# Patient Record
Sex: FEMALE | Race: BLACK OR AFRICAN AMERICAN | NOT HISPANIC OR LATINO | Employment: UNEMPLOYED | URBAN - METROPOLITAN AREA
[De-identification: names, ages, dates, MRNs, and addresses within clinical notes are randomized per-mention and may not be internally consistent; named-entity substitution may affect disease eponyms.]

---

## 2017-05-30 ENCOUNTER — RECORDS - HEALTHEAST (OUTPATIENT)
Dept: LAB | Facility: CLINIC | Age: 32
End: 2017-05-30

## 2017-05-30 LAB — HIV 1+2 AB+HIV1 P24 AG SERPL QL IA: NEGATIVE

## 2017-05-31 LAB
HBV SURFACE AG SERPL QL IA: NEGATIVE
SYPHILIS RPR SCREEN - HISTORICAL: NORMAL

## 2017-10-27 ENCOUNTER — RECORDS - HEALTHEAST (OUTPATIENT)
Dept: LAB | Facility: CLINIC | Age: 32
End: 2017-10-27

## 2017-10-30 LAB — SYPHILIS RPR SCREEN - HISTORICAL: NORMAL

## 2018-01-05 ENCOUNTER — ANESTHESIA - HEALTHEAST (OUTPATIENT)
Dept: OBGYN | Facility: HOSPITAL | Age: 33
End: 2018-01-05

## 2021-06-15 NOTE — ANESTHESIA PROCEDURE NOTES
Epidural Block    Patient location during procedure: OB  Time Called: 1/5/2018 11:28 AM  Reason for Block:labor epidural  Staffing:  Performing  Anesthesiologist: KATI COOPER  Preanesthetic Checklist  Completed: patient identified, risks, benefits, and alternatives discussed, timeout performed, consent obtained, airway assessed, oxygen available, suction available, emergency drugs available and hand hygiene performed  Procedure  Patient position: left lateral decubitus  Prep: Betadine and ChloraPrep  Patient monitoring: continuous pulse oximetry, heart rate and blood pressure  Approach: midline  Location: L1-L2  Injection technique: EMMA saline  Number of Attempts:1  Needle  Needle type: Fany   Needle gauge: 18 G     Catheter in Space: 4  Assessment  Sensory level: T8  No complications

## 2021-06-15 NOTE — ANESTHESIA PREPROCEDURE EVALUATION
Anesthesia Evaluation      Patient summary reviewed   No history of anesthetic complications     Airway   Mallampati: I  Neck ROM: full   Pulmonary - negative ROS and normal exam                          Cardiovascular - negative ROS and normal exam  (-) murmur  Rhythm: regular  Rate: normal,    no murmur      Neuro/Psych - negative ROS     Endo/Other    (+) pregnant     GI/Hepatic/Renal - negative ROS           Dental - normal exam                        Anesthesia Plan  Planned anesthetic: epidural    ASA 2     Anesthetic plan and risks discussed with: patient and spouse    Post-op plan: routine recovery

## 2021-06-15 NOTE — ANESTHESIA POSTPROCEDURE EVALUATION
Patient: Lupe Andersen  * No procedures listed *  Anesthesia type: epidural    Patient location: [facility]  Last vitals:   Vitals:    01/06/18 0839   BP: 113/65   Pulse: 65   Resp: 20   Temp: 36.7  C (98  F)   SpO2:      Post vital signs: stable  Level of consciousness: awake and responds to simple questions  Post-anesthesia pain: pain controlled  Post-anesthesia nausea and vomiting: no  Pulmonary: unassisted, return to baseline  Cardiovascular: stable and blood pressure at baseline  Hydration: adequate  Anesthetic events: no    QCDR Measures:  ASA# 11 - Domenica-op Cardiac Arrest: ASA11B - Patient did NOT experience unanticipated cardiac arrest  ASA# 12 - Domenica-op Mortality Rate: ASA12B - Patient did NOT die  ASA# 13 - PACU Re-Intubation Rate: NA - No ETT / LMA used for case  ASA# 10 - Composite Anes Safety: ASA10A - No serious adverse event    Additional Notes:

## 2021-06-16 PROBLEM — Z34.90 PREGNANT: Status: ACTIVE | Noted: 2018-01-05

## 2021-09-16 ENCOUNTER — LAB REQUISITION (OUTPATIENT)
Dept: LAB | Facility: CLINIC | Age: 36
End: 2021-09-16

## 2021-09-16 DIAGNOSIS — R10.9 UNSPECIFIED ABDOMINAL PAIN: ICD-10-CM

## 2021-09-16 PROCEDURE — 87086 URINE CULTURE/COLONY COUNT: CPT | Performed by: PHYSICIAN ASSISTANT

## 2021-09-16 PROCEDURE — 84702 CHORIONIC GONADOTROPIN TEST: CPT | Performed by: PHYSICIAN ASSISTANT

## 2021-09-17 LAB
BACTERIA UR CULT: NO GROWTH
HCG SERPL-ACNC: 6646 MLU/ML (ref 0–4)

## 2021-10-10 LAB
HEMOGLOBIN (EXTERNAL): 11.5 G/DL (ref 12–16)
HEMOGLOBIN (EXTERNAL): 11.5 G/DL (ref 12–16)
HEPATITIS B SURFACE ANTIGEN (EXTERNAL): NONREACTIVE
HEPATITIS C ANTIBODY (EXTERNAL): NONREACTIVE
HIV1+2 AB SERPL QL IA: NONREACTIVE
PLATELET COUNT (EXTERNAL): 210 10E3/UL (ref 150–400)
RUBELLA ANTIBODY IGG (EXTERNAL): NORMAL
TREPONEMA PALLIDUM ANTIBODY (EXTERNAL): NONREACTIVE

## 2021-10-18 ENCOUNTER — APPOINTMENT (OUTPATIENT)
Dept: ULTRASOUND IMAGING | Facility: HOSPITAL | Age: 36
End: 2021-10-18
Attending: FAMILY MEDICINE
Payer: COMMERCIAL

## 2021-10-18 ENCOUNTER — HOSPITAL ENCOUNTER (EMERGENCY)
Facility: HOSPITAL | Age: 36
Discharge: HOME | End: 2021-10-18
Attending: EMERGENCY MEDICINE | Admitting: EMERGENCY MEDICINE
Payer: COMMERCIAL

## 2021-10-18 VITALS
RESPIRATION RATE: 18 BRPM | WEIGHT: 134 LBS | HEART RATE: 74 BPM | DIASTOLIC BLOOD PRESSURE: 74 MMHG | BODY MASS INDEX: 23.74 KG/M2 | TEMPERATURE: 98.4 F | HEIGHT: 63 IN | SYSTOLIC BLOOD PRESSURE: 119 MMHG | OXYGEN SATURATION: 100 %

## 2021-10-18 DIAGNOSIS — R10.2 SUPRAPUBIC ABDOMINAL PAIN: ICD-10-CM

## 2021-10-18 DIAGNOSIS — O26.891 ABDOMINAL PAIN IN PREGNANCY, FIRST TRIMESTER: ICD-10-CM

## 2021-10-18 DIAGNOSIS — R10.9 ABDOMINAL PAIN IN PREGNANCY, FIRST TRIMESTER: ICD-10-CM

## 2021-10-18 PROBLEM — M54.30 SCIATICA: Status: ACTIVE | Noted: 2021-10-18

## 2021-10-18 LAB — HCG SERPL-ACNC: ABNORMAL MLU/ML (ref 0–4)

## 2021-10-18 PROCEDURE — 99285 EMERGENCY DEPT VISIT HI MDM: CPT | Mod: 25

## 2021-10-18 PROCEDURE — 36415 COLL VENOUS BLD VENIPUNCTURE: CPT | Performed by: FAMILY MEDICINE

## 2021-10-18 PROCEDURE — 76801 OB US < 14 WKS SINGLE FETUS: CPT

## 2021-10-18 PROCEDURE — 84702 CHORIONIC GONADOTROPIN TEST: CPT | Performed by: FAMILY MEDICINE

## 2021-10-18 ASSESSMENT — MIFFLIN-ST. JEOR: SCORE: 1271.95

## 2021-10-18 NOTE — DISCHARGE INSTRUCTIONS
Ultrasound today shows pregnancy in the uterus measuring 9 weeks 4 days with normal fetal heart rate.  We could not see the IUD on your ultrasound today.  Follow-up with your doctor as discussed.  Return to the ER for worsening pain.

## 2021-10-18 NOTE — ED TRIAGE NOTES
Pt arrives via [facility] from home with c/o lower abdominal pain. Pt is about 9 weeks pregnant. 3 previous pregnancies all went to full term without complications. Pt states that she had lower abdominal pain and spotting a few days ago, called her provider and was told to take ibuprofen for pain. Pt states that last dose was 2 days ago.   Pt denies vaginal bleeding today. Pt called EMS this AM due to sudden onset of 10/10 sharp lower abdominal cramping.

## 2021-10-18 NOTE — ED PROVIDER NOTES
EMERGENCY DEPARTMENT ENCOUNTER      NAME: Lupe Andersen  AGE: 36 year old female  YOB: 1985  MRN: [id number]  EVALUATION DATE & TIME: No admission date for patient encounter.    PCP: Beti Velásquez    ED PROVIDER: Celia Bray MD    Chief Complaint   Patient presents with     Abdominal Pain     Pregnancy Complications         FINAL IMPRESSION:  1. Suprapubic abdominal pain    2. Abdominal pain in pregnancy, first trimester          ED COURSE & MEDICAL DECISION MAKING:    Pertinent Labs & Imaging studies reviewed. (See chart for details)  36 year old female with history of G4, P3 at approximately 9 weeks based on LMP and previous ultrasound who presents to the Emergency Department for evaluation of abdominal pain in the suprapubic region.  She has had some abdominal pain and spotting throughout this pregnancy.  Differential includes pregnancy, threatened AB.  Her pain is suprapubic.  Reportedly had urinalysis done just 2 days ago as outpatient that was negative.  She does not have any urinary symptoms and therefore does not want to repeat this today.  She has had previous appendectomy.  No change in vaginal discharge.  No change in bowel movements.  A pelvic ultrasound was performed.  This shows single living intrauterine pregnancy at 9 weeks 4 days.  Fetal heart rate 167.  Of note patient has an IUD.  Her physician tried to remove this but was not able to do so.  Initially on her imaging they were not able to find the IUD but patient states that later on outpatient ultrasound they were then able to find it.  She is not able to tell me physically where this was located but was told that it was in an okay position.  On her ultrasound today we were not able to visualize the IUD.  She however does not have any peritoneal signs and my suspicion that the IUD has traveled, perforated is low and I do not think she warrants evaluation for same.  Knowing that they have had issues visualizing  her IUD previously we will would recommend she follow-up with her physician regarding same.  Warning signs return to the ED were discussed.  Encourage close follow-up.      ED Course as of Oct 18 150   Mon Oct 18, 2021   1237 I met with the patient, obtained history, performed an initial exam, and discussed options and plan for diagnostics and treatment here in the ED. I discussed the plan for discharge with the patient, and patient is agreeable.  We discussed supportive cares at home and reasons for return to the ER including new or worsening symptoms - all questions and concerns addressed.  Patient to be discharged by RN.            At the conclusion of the encounter I discussed the results of all of the tests and the disposition. The questions were answered. The patient or family acknowledged understanding and was agreeable with the care plan.      MEDICATIONS GIVEN IN THE EMERGENCY:  Medications - No data to display    NEW PRESCRIPTIONS STARTED AT TODAY'S ER VISIT  Discharge Medication List as of 10/18/2021  1:20 PM             =================================================================    HPI    Patient information was obtained from: patient    Use of Intrepreter: N/A        Lupe Black Batool Andersen is a 36 year old female with pertinent medical history of uterine atony who presents with abdominal pain.     Patient is  and ~9 weeks pregnant. Patient reports she has been feeling suprapubic cramping and spotting since she found out about the pregnancy. Patient presented today because she suprapubic pain became very severe and radiates into her back, although denies spotting. Patient reports she was unable to walk or stand from the pain.     Patient has an IUD currently in place which her OBGYN has attempted to remove twice. Patient reports having a vaginal delivery for her other three pregnancies. Patient has a surgical history of appendectomy but denies any medical history of problems with her  fallopian tubes, ovaries, or uterus. Patient denies dysuria, hematuria, diarrhea, constipation, bloody stool, or any other complaints at this time.       REVIEW OF SYSTEMS  Constitutional:  Denies fever, chills, weight loss or weakness  GI:  Denies nausea, vomiting, diarrhea. Positive for suprapubic abdominal pain.  : Denies dysuria, denies hematuria. Positive for vaginal spotting (resolved).   Musculoskeletal:  Denies any new muscle/joint pain, swelling or loss of function. Positive for back pain.  All other systems negative unless noted in HPI.      PAST MEDICAL HISTORY:  History reviewed. No pertinent past medical history.    PAST SURGICAL HISTORY:  Past Surgical History:   Procedure Laterality Date     APPENDECTOMY         CURRENT MEDICATIONS:    Prior to Admission Medications   Prescriptions Last Dose Informant Patient Reported? Taking?   acetaminophen (TYLENOL) 325 MG tablet   No No   Sig: [ACETAMINOPHEN (TYLENOL) 325 MG TABLET] Take 1-2 tablets (325-650 mg total) by mouth every 4 (four) hours as needed.   ferrous sulfate 65 mg elemental iron   Yes No   Sig: [FERROUS SULFATE 65 MG ELEMENTAL IRON] Take 1 tablet by mouth daily with breakfast.   ibuprofen (ADVIL,MOTRIN) 800 MG tablet   No No   Sig: [IBUPROFEN (ADVIL,MOTRIN) 800 MG TABLET] Take 1 tablet (800 mg total) by mouth every 8 (eight) hours.   lanolin (LANSINOH HPA) 100 % Oint   No No   Sig: [LANOLIN (LANSINOH HPA) 100 % OINT] Apply to nipples after feeds   prenatal vitamin iron-folic acid 27mg-0.8mg (PRENATAL S) 27 mg iron- 800 mcg Tab tablet   Yes No   Sig: [PRENATAL VITAMIN IRON-FOLIC ACID 27MG-0.8MG (PRENATAL S) 27 MG IRON- 800 MCG TAB TABLET] Take 1 tablet by mouth daily.      Facility-Administered Medications: None       ALLERGIES:  Allergies   Allergen Reactions     Flagyl [Metronidazole] Dizziness       FAMILY HISTORY:  History reviewed. No pertinent family history.    SOCIAL HISTORY:  Social History     Tobacco Use     Smoking status: Never  "Smoker     Smokeless tobacco: Never Used   Substance Use Topics     Alcohol use: No     Drug use: No        VITALS:  Patient Vitals for the past 24 hrs:   BP Temp Temp src Pulse Resp SpO2 Height Weight   10/18/21 1328 119/74 -- -- 74 18 -- -- --   10/18/21 1043 115/65 98.4  F (36.9  C) Temporal 85 18 100 % 1.6 m (5' 3\") 60.8 kg (134 lb)       PHYSICAL EXAM    General Appearance: Well-appearing, well-nourished, no acute distress   Head:  Normocephalic  Eyes:   conjunctiva/corneas clear  ENT:  membranes are moist without pallor  Neck:  Supple  Cardio:  Regular rate and rhythm  Pulm:  No respiratory distress  Back:  No CVA tenderness, normal ROM  Abdomen:  Soft, non distended,no rebound or guarding. Slight suprapubic abdominal pain.  Extremities: Moves all extremities normally, normal gait  Skin:  Skin warm, dry, no rashes  Neuro:  Alert and oriented ×3, moving all extremities, no gross sensory defects     RADIOLOGY/LABS:  Reviewed all pertinent imaging. Please see official radiology report. All pertinent labs reviewed and interpreted.    Results for orders placed or performed during the hospital encounter of 10/18/21   OB  US 1st trimester w transvag    Impression    IMPRESSION:   1.  Single living intrauterine gestation at 9 weeks 4 days, EDC 5/19/2022.  2.  No IUD visualized.       HCG quantitative pregnancy   Result Value Ref Range    hCG Quantitative 200,357 (H) 0 - 4 mlU/mL       The creation of this record is based on the scribe s observations of the work being performed by Celia Bray MD and the provider s statements to them. It was created on his behalf by Yesenia Hamlin, a trained medical scribe. This document has been checked and approved by the attending provider.    Celia Bray MD  Emergency Medicine  [facility] EMERGENCY DEPARTMENT  [facility] [facility] [address] [phone]  Dept: [phone]     Celia Bray MD  10/18/21 1507    "

## 2021-11-19 ENCOUNTER — LAB REQUISITION (OUTPATIENT)
Dept: LAB | Facility: CLINIC | Age: 36
End: 2021-11-19

## 2021-11-19 DIAGNOSIS — Z34.81 ENCOUNTER FOR SUPERVISION OF OTHER NORMAL PREGNANCY, FIRST TRIMESTER: ICD-10-CM

## 2021-11-19 PROCEDURE — G0123 SCREEN CERV/VAG THIN LAYER: HCPCS | Performed by: FAMILY MEDICINE

## 2021-11-19 PROCEDURE — 87624 HPV HI-RISK TYP POOLED RSLT: CPT | Performed by: FAMILY MEDICINE

## 2021-11-23 ENCOUNTER — LAB REQUISITION (OUTPATIENT)
Dept: LAB | Facility: CLINIC | Age: 36
End: 2021-11-23
Payer: COMMERCIAL

## 2021-11-23 DIAGNOSIS — Z11.52 ENCOUNTER FOR SCREENING FOR COVID-19: ICD-10-CM

## 2021-11-23 LAB
HUMAN PAPILLOMA VIRUS 16 DNA: NEGATIVE
HUMAN PAPILLOMA VIRUS 18 DNA: NEGATIVE
HUMAN PAPILLOMA VIRUS FINAL DIAGNOSIS: NORMAL
HUMAN PAPILLOMA VIRUS OTHER HR: NEGATIVE
SARS-COV-2 RNA RESP QL NAA+PROBE: NEGATIVE

## 2021-11-23 PROCEDURE — U0005 INFEC AGEN DETEC AMPLI PROBE: HCPCS | Mod: ORL | Performed by: FAMILY MEDICINE

## 2021-11-30 LAB
BKR LAB AP GYN ADEQUACY: NORMAL
BKR LAB AP GYN INTERPRETATION: NORMAL
BKR LAB AP GYN OTHER FINDINGS: NORMAL
BKR LAB AP HPV REFLEX: NORMAL
BKR LAB AP PREVIOUS ABNORMAL: NORMAL
PATH REPORT.COMMENTS IMP SPEC: NORMAL
PATH REPORT.COMMENTS IMP SPEC: NORMAL
PATH REPORT.RELEVANT HX SPEC: NORMAL

## 2022-02-07 ENCOUNTER — LAB REQUISITION (OUTPATIENT)
Dept: LAB | Facility: CLINIC | Age: 37
End: 2022-02-07

## 2022-02-07 DIAGNOSIS — R53.83 OTHER FATIGUE: ICD-10-CM

## 2022-02-07 PROCEDURE — 84466 ASSAY OF TRANSFERRIN: CPT | Performed by: PHYSICIAN ASSISTANT

## 2022-02-07 PROCEDURE — 82728 ASSAY OF FERRITIN: CPT | Performed by: PHYSICIAN ASSISTANT

## 2022-02-08 LAB
FERRITIN SERPL-MCNC: 24 NG/ML (ref 10–130)
IRON SATN MFR SERPL: 35 % (ref 20–50)
IRON SERPL-MCNC: 160 UG/DL (ref 42–175)
TIBC SERPL-MCNC: 453 UG/DL (ref 313–563)
TRANSFERRIN SERPL-MCNC: 362 MG/DL (ref 212–360)

## 2022-02-11 ENCOUNTER — LAB REQUISITION (OUTPATIENT)
Dept: LAB | Facility: CLINIC | Age: 37
End: 2022-02-11

## 2022-02-11 DIAGNOSIS — R10.2 PELVIC AND PERINEAL PAIN: ICD-10-CM

## 2022-02-11 PROCEDURE — 87086 URINE CULTURE/COLONY COUNT: CPT | Performed by: FAMILY MEDICINE

## 2022-02-13 LAB — BACTERIA UR CULT: NO GROWTH

## 2022-03-14 ENCOUNTER — LAB REQUISITION (OUTPATIENT)
Dept: LAB | Facility: CLINIC | Age: 37
End: 2022-03-14

## 2022-03-14 DIAGNOSIS — Z34.83 ENCOUNTER FOR SUPERVISION OF OTHER NORMAL PREGNANCY, THIRD TRIMESTER: ICD-10-CM

## 2022-03-14 PROCEDURE — 86780 TREPONEMA PALLIDUM: CPT | Performed by: FAMILY MEDICINE

## 2022-03-15 LAB — T PALLIDUM AB SER QL: NONREACTIVE

## 2022-05-02 ENCOUNTER — LAB REQUISITION (OUTPATIENT)
Dept: LAB | Facility: CLINIC | Age: 37
End: 2022-05-02

## 2022-05-02 DIAGNOSIS — Z34.83 ENCOUNTER FOR SUPERVISION OF OTHER NORMAL PREGNANCY, THIRD TRIMESTER: ICD-10-CM

## 2022-05-02 PROCEDURE — 87653 STREP B DNA AMP PROBE: CPT | Performed by: FAMILY MEDICINE

## 2022-05-03 LAB — GP B STREP DNA SPEC QL NAA+PROBE: NEGATIVE

## 2022-05-11 ENCOUNTER — ANESTHESIA (OUTPATIENT)
Dept: OBGYN | Facility: HOSPITAL | Age: 37
End: 2022-05-11
Payer: COMMERCIAL

## 2022-05-11 ENCOUNTER — HOSPITAL ENCOUNTER (INPATIENT)
Facility: HOSPITAL | Age: 37
LOS: 2 days | Discharge: HOME | End: 2022-05-13
Attending: FAMILY MEDICINE | Admitting: FAMILY MEDICINE
Payer: COMMERCIAL

## 2022-05-11 ENCOUNTER — ANESTHESIA EVENT (OUTPATIENT)
Dept: OBGYN | Facility: HOSPITAL | Age: 37
End: 2022-05-11
Payer: COMMERCIAL

## 2022-05-11 DIAGNOSIS — D62 ANEMIA DUE TO BLOOD LOSS, ACUTE: ICD-10-CM

## 2022-05-11 PROBLEM — Z36.89 ENCOUNTER FOR TRIAGE IN PREGNANT PATIENT: Status: ACTIVE | Noted: 2022-05-11

## 2022-05-11 PROBLEM — O47.9 UTERINE CONTRACTIONS: Status: ACTIVE | Noted: 2022-05-11

## 2022-05-11 LAB
ABO/RH(D): NORMAL
ANTIBODY SCREEN: NEGATIVE
APTT PPP: 24 SECONDS (ref 22–38)
BLD PROD TYP BPU: NORMAL
BLOOD COMPONENT TYPE: NORMAL
CODING SYSTEM: NORMAL
CROSSMATCH: NORMAL
D DIMER PPP FEU-MCNC: 12.89 UG/ML FEU (ref 0–0.5)
ERYTHROCYTE [DISTWIDTH] IN BLOOD BY AUTOMATED COUNT: 14.6 % (ref 10–15)
FIBRINOGEN PPP-MCNC: 446 MG/DL (ref 170–490)
HCT VFR BLD AUTO: 33.6 % (ref 35–47)
HGB BLD-MCNC: 10.5 G/DL (ref 11.7–15.7)
HOLD SPECIMEN: NORMAL
INR PPP: 1.03 (ref 0.85–1.15)
ISSUE DATE AND TIME: NORMAL
MCH RBC QN AUTO: 28.5 PG (ref 26.5–33)
MCHC RBC AUTO-ENTMCNC: 31.3 G/DL (ref 31.5–36.5)
MCV RBC AUTO: 91 FL (ref 78–100)
PLATELET # BLD AUTO: 164 10E3/UL (ref 150–450)
RBC # BLD AUTO: 3.69 10E6/UL (ref 3.8–5.2)
SARS-COV-2 RNA RESP QL NAA+PROBE: NEGATIVE
SPECIMEN EXPIRATION DATE: NORMAL
UNIT ABO/RH: NORMAL
UNIT NUMBER: NORMAL
UNIT STATUS: NORMAL
UNIT TYPE ISBT: 7300
WBC # BLD AUTO: 17 10E3/UL (ref 4–11)

## 2022-05-11 PROCEDURE — 36415 COLL VENOUS BLD VENIPUNCTURE: CPT | Performed by: FAMILY MEDICINE

## 2022-05-11 PROCEDURE — 85730 THROMBOPLASTIN TIME PARTIAL: CPT | Performed by: FAMILY MEDICINE

## 2022-05-11 PROCEDURE — 250N000011 HC RX IP 250 OP 636: Performed by: OBSTETRICS & GYNECOLOGY

## 2022-05-11 PROCEDURE — 250N000011 HC RX IP 250 OP 636: Performed by: FAMILY MEDICINE

## 2022-05-11 PROCEDURE — 10D17Z9 MANUAL EXTRACTION OF PRODUCTS OF CONCEPTION, RETAINED, VIA NATURAL OR ARTIFICIAL OPENING: ICD-10-PCS | Performed by: OBSTETRICS & GYNECOLOGY

## 2022-05-11 PROCEDURE — 86850 RBC ANTIBODY SCREEN: CPT | Performed by: FAMILY MEDICINE

## 2022-05-11 PROCEDURE — 250N000009 HC RX 250: Performed by: FAMILY MEDICINE

## 2022-05-11 PROCEDURE — 370N000003 HC ANESTHESIA WARD SERVICE

## 2022-05-11 PROCEDURE — 85027 COMPLETE CBC AUTOMATED: CPT | Performed by: FAMILY MEDICINE

## 2022-05-11 PROCEDURE — 120N000001 HC R&B MED SURG/OB

## 2022-05-11 PROCEDURE — 85384 FIBRINOGEN ACTIVITY: CPT | Performed by: FAMILY MEDICINE

## 2022-05-11 PROCEDURE — P9016 RBC LEUKOCYTES REDUCED: HCPCS | Performed by: FAMILY MEDICINE

## 2022-05-11 PROCEDURE — 250N000011 HC RX IP 250 OP 636: Performed by: ANESTHESIOLOGY

## 2022-05-11 PROCEDURE — 722N000001 HC LABOR CARE VAGINAL DELIVERY SINGLE

## 2022-05-11 PROCEDURE — 3E0R3BZ INTRODUCTION OF ANESTHETIC AGENT INTO SPINAL CANAL, PERCUTANEOUS APPROACH: ICD-10-PCS | Performed by: ANESTHESIOLOGY

## 2022-05-11 PROCEDURE — 86923 COMPATIBILITY TEST ELECTRIC: CPT | Performed by: FAMILY MEDICINE

## 2022-05-11 PROCEDURE — 00HU33Z INSERTION OF INFUSION DEVICE INTO SPINAL CANAL, PERCUTANEOUS APPROACH: ICD-10-PCS | Performed by: ANESTHESIOLOGY

## 2022-05-11 PROCEDURE — 250N000009 HC RX 250: Performed by: ANESTHESIOLOGY

## 2022-05-11 PROCEDURE — 85610 PROTHROMBIN TIME: CPT | Performed by: FAMILY MEDICINE

## 2022-05-11 PROCEDURE — 85379 FIBRIN DEGRADATION QUANT: CPT | Performed by: FAMILY MEDICINE

## 2022-05-11 PROCEDURE — 86901 BLOOD TYPING SEROLOGIC RH(D): CPT | Performed by: FAMILY MEDICINE

## 2022-05-11 PROCEDURE — 258N000003 HC RX IP 258 OP 636: Performed by: FAMILY MEDICINE

## 2022-05-11 PROCEDURE — 87635 SARS-COV-2 COVID-19 AMP PRB: CPT | Performed by: FAMILY MEDICINE

## 2022-05-11 RX ORDER — DOCUSATE SODIUM 100 MG/1
100 CAPSULE, LIQUID FILLED ORAL DAILY
Status: DISCONTINUED | OUTPATIENT
Start: 2022-05-11 | End: 2022-05-13 | Stop reason: HOSPADM

## 2022-05-11 RX ORDER — NALOXONE HYDROCHLORIDE 0.4 MG/ML
0.2 INJECTION, SOLUTION INTRAMUSCULAR; INTRAVENOUS; SUBCUTANEOUS
Status: DISCONTINUED | OUTPATIENT
Start: 2022-05-11 | End: 2022-05-11 | Stop reason: HOSPADM

## 2022-05-11 RX ORDER — KETOROLAC TROMETHAMINE 30 MG/ML
30 INJECTION, SOLUTION INTRAMUSCULAR; INTRAVENOUS
Status: DISCONTINUED | OUTPATIENT
Start: 2022-05-11 | End: 2022-05-13 | Stop reason: HOSPADM

## 2022-05-11 RX ORDER — LIDOCAINE HCL/EPINEPHRINE/PF 2%-1:200K
VIAL (ML) INJECTION
Status: COMPLETED | OUTPATIENT
Start: 2022-05-11 | End: 2022-05-11

## 2022-05-11 RX ORDER — OXYTOCIN 10 [USP'U]/ML
10 INJECTION, SOLUTION INTRAMUSCULAR; INTRAVENOUS
Status: DISCONTINUED | OUTPATIENT
Start: 2022-05-11 | End: 2022-05-13 | Stop reason: HOSPADM

## 2022-05-11 RX ORDER — DIPHENHYDRAMINE HYDROCHLORIDE 50 MG/ML
50 INJECTION INTRAMUSCULAR; INTRAVENOUS ONCE
Status: DISCONTINUED | OUTPATIENT
Start: 2022-05-11 | End: 2022-05-11

## 2022-05-11 RX ORDER — ONDANSETRON 2 MG/ML
4 INJECTION INTRAMUSCULAR; INTRAVENOUS EVERY 6 HOURS PRN
Status: DISCONTINUED | OUTPATIENT
Start: 2022-05-11 | End: 2022-05-11 | Stop reason: HOSPADM

## 2022-05-11 RX ORDER — LIDOCAINE 40 MG/G
CREAM TOPICAL
Status: DISCONTINUED | OUTPATIENT
Start: 2022-05-11 | End: 2022-05-11 | Stop reason: HOSPADM

## 2022-05-11 RX ORDER — MISOPROSTOL 200 UG/1
400 TABLET ORAL
Status: DISCONTINUED | OUTPATIENT
Start: 2022-05-11 | End: 2022-05-11 | Stop reason: HOSPADM

## 2022-05-11 RX ORDER — MISOPROSTOL 200 UG/1
800 TABLET ORAL
Status: DISCONTINUED | OUTPATIENT
Start: 2022-05-11 | End: 2022-05-11 | Stop reason: HOSPADM

## 2022-05-11 RX ORDER — CEFAZOLIN SODIUM/WATER 2 G/20 ML
2 SYRINGE (ML) INTRAVENOUS EVERY 8 HOURS
Status: COMPLETED | OUTPATIENT
Start: 2022-05-11 | End: 2022-05-11

## 2022-05-11 RX ORDER — METHYLERGONOVINE MALEATE 0.2 MG/ML
200 INJECTION INTRAVENOUS
Status: DISCONTINUED | OUTPATIENT
Start: 2022-05-11 | End: 2022-05-13 | Stop reason: HOSPADM

## 2022-05-11 RX ORDER — SODIUM CHLORIDE, SODIUM LACTATE, POTASSIUM CHLORIDE, CALCIUM CHLORIDE 600; 310; 30; 20 MG/100ML; MG/100ML; MG/100ML; MG/100ML
INJECTION, SOLUTION INTRAVENOUS CONTINUOUS PRN
Status: DISCONTINUED | OUTPATIENT
Start: 2022-05-11 | End: 2022-05-11 | Stop reason: HOSPADM

## 2022-05-11 RX ORDER — ONDANSETRON 4 MG/1
4 TABLET, ORALLY DISINTEGRATING ORAL EVERY 6 HOURS PRN
Status: DISCONTINUED | OUTPATIENT
Start: 2022-05-11 | End: 2022-05-11 | Stop reason: HOSPADM

## 2022-05-11 RX ORDER — OXYTOCIN 10 [USP'U]/ML
10 INJECTION, SOLUTION INTRAMUSCULAR; INTRAVENOUS
Status: DISCONTINUED | OUTPATIENT
Start: 2022-05-11 | End: 2022-05-11 | Stop reason: HOSPADM

## 2022-05-11 RX ORDER — NALBUPHINE HYDROCHLORIDE 10 MG/ML
2.5-5 INJECTION, SOLUTION INTRAMUSCULAR; INTRAVENOUS; SUBCUTANEOUS EVERY 6 HOURS PRN
Status: DISCONTINUED | OUTPATIENT
Start: 2022-05-11 | End: 2022-05-13 | Stop reason: HOSPADM

## 2022-05-11 RX ORDER — MISOPROSTOL 200 UG/1
800 TABLET ORAL
Status: DISCONTINUED | OUTPATIENT
Start: 2022-05-11 | End: 2022-05-13 | Stop reason: HOSPADM

## 2022-05-11 RX ORDER — SODIUM CHLORIDE, SODIUM LACTATE, POTASSIUM CHLORIDE, CALCIUM CHLORIDE 600; 310; 30; 20 MG/100ML; MG/100ML; MG/100ML; MG/100ML
INJECTION, SOLUTION INTRAVENOUS CONTINUOUS
Status: DISCONTINUED | OUTPATIENT
Start: 2022-05-11 | End: 2022-05-13 | Stop reason: HOSPADM

## 2022-05-11 RX ORDER — CARBOPROST TROMETHAMINE 250 UG/ML
250 INJECTION, SOLUTION INTRAMUSCULAR
Status: DISCONTINUED | OUTPATIENT
Start: 2022-05-11 | End: 2022-05-11 | Stop reason: HOSPADM

## 2022-05-11 RX ORDER — METHYLERGONOVINE MALEATE 0.2 MG/ML
200 INJECTION INTRAVENOUS
Status: DISCONTINUED | OUTPATIENT
Start: 2022-05-11 | End: 2022-05-11 | Stop reason: HOSPADM

## 2022-05-11 RX ORDER — PROCHLORPERAZINE 25 MG
25 SUPPOSITORY, RECTAL RECTAL EVERY 12 HOURS PRN
Status: DISCONTINUED | OUTPATIENT
Start: 2022-05-11 | End: 2022-05-11 | Stop reason: HOSPADM

## 2022-05-11 RX ORDER — CITRIC ACID/SODIUM CITRATE 334-500MG
30 SOLUTION, ORAL ORAL
Status: DISCONTINUED | OUTPATIENT
Start: 2022-05-11 | End: 2022-05-11 | Stop reason: HOSPADM

## 2022-05-11 RX ORDER — NALOXONE HYDROCHLORIDE 0.4 MG/ML
0.4 INJECTION, SOLUTION INTRAMUSCULAR; INTRAVENOUS; SUBCUTANEOUS
Status: DISCONTINUED | OUTPATIENT
Start: 2022-05-11 | End: 2022-05-11 | Stop reason: HOSPADM

## 2022-05-11 RX ORDER — OXYTOCIN/0.9 % SODIUM CHLORIDE 30/500 ML
340 PLASTIC BAG, INJECTION (ML) INTRAVENOUS CONTINUOUS PRN
Status: DISCONTINUED | OUTPATIENT
Start: 2022-05-11 | End: 2022-05-11 | Stop reason: HOSPADM

## 2022-05-11 RX ORDER — METOCLOPRAMIDE 10 MG/1
10 TABLET ORAL EVERY 6 HOURS PRN
Status: DISCONTINUED | OUTPATIENT
Start: 2022-05-11 | End: 2022-05-11 | Stop reason: HOSPADM

## 2022-05-11 RX ORDER — IBUPROFEN 800 MG/1
800 TABLET, FILM COATED ORAL
Status: DISCONTINUED | OUTPATIENT
Start: 2022-05-11 | End: 2022-05-13 | Stop reason: HOSPADM

## 2022-05-11 RX ORDER — PROCHLORPERAZINE MALEATE 10 MG
10 TABLET ORAL EVERY 6 HOURS PRN
Status: DISCONTINUED | OUTPATIENT
Start: 2022-05-11 | End: 2022-05-11 | Stop reason: HOSPADM

## 2022-05-11 RX ORDER — ACETAMINOPHEN 325 MG/1
650 TABLET ORAL EVERY 4 HOURS PRN
Status: DISCONTINUED | OUTPATIENT
Start: 2022-05-11 | End: 2022-05-13 | Stop reason: HOSPADM

## 2022-05-11 RX ORDER — OXYTOCIN/0.9 % SODIUM CHLORIDE 30/500 ML
1-24 PLASTIC BAG, INJECTION (ML) INTRAVENOUS CONTINUOUS
Status: DISCONTINUED | OUTPATIENT
Start: 2022-05-11 | End: 2022-05-11 | Stop reason: HOSPADM

## 2022-05-11 RX ORDER — FENTANYL CITRATE-0.9 % NACL/PF 10 MCG/ML
100 PLASTIC BAG, INJECTION (ML) INTRAVENOUS EVERY 5 MIN PRN
Status: DISCONTINUED | OUTPATIENT
Start: 2022-05-11 | End: 2022-05-11 | Stop reason: HOSPADM

## 2022-05-11 RX ORDER — CARBOPROST TROMETHAMINE 250 UG/ML
250 INJECTION, SOLUTION INTRAMUSCULAR
Status: DISCONTINUED | OUTPATIENT
Start: 2022-05-11 | End: 2022-05-13 | Stop reason: HOSPADM

## 2022-05-11 RX ORDER — METOCLOPRAMIDE HYDROCHLORIDE 5 MG/ML
10 INJECTION INTRAMUSCULAR; INTRAVENOUS EVERY 6 HOURS PRN
Status: DISCONTINUED | OUTPATIENT
Start: 2022-05-11 | End: 2022-05-11 | Stop reason: HOSPADM

## 2022-05-11 RX ORDER — MISOPROSTOL 200 UG/1
400 TABLET ORAL
Status: DISCONTINUED | OUTPATIENT
Start: 2022-05-11 | End: 2022-05-13 | Stop reason: HOSPADM

## 2022-05-11 RX ORDER — SODIUM CHLORIDE, SODIUM LACTATE, POTASSIUM CHLORIDE, CALCIUM CHLORIDE 600; 310; 30; 20 MG/100ML; MG/100ML; MG/100ML; MG/100ML
INJECTION, SOLUTION INTRAVENOUS CONTINUOUS
Status: DISCONTINUED | OUTPATIENT
Start: 2022-05-11 | End: 2022-05-11 | Stop reason: HOSPADM

## 2022-05-11 RX ORDER — BISACODYL 10 MG
10 SUPPOSITORY, RECTAL RECTAL DAILY PRN
Status: DISCONTINUED | OUTPATIENT
Start: 2022-05-11 | End: 2022-05-13 | Stop reason: HOSPADM

## 2022-05-11 RX ORDER — HYDROCORTISONE 25 MG/G
CREAM TOPICAL 3 TIMES DAILY PRN
Status: DISCONTINUED | OUTPATIENT
Start: 2022-05-11 | End: 2022-05-13 | Stop reason: HOSPADM

## 2022-05-11 RX ORDER — IBUPROFEN 800 MG/1
800 TABLET, FILM COATED ORAL EVERY 6 HOURS PRN
Status: DISCONTINUED | OUTPATIENT
Start: 2022-05-11 | End: 2022-05-13 | Stop reason: HOSPADM

## 2022-05-11 RX ORDER — OXYTOCIN/0.9 % SODIUM CHLORIDE 30/500 ML
100-340 PLASTIC BAG, INJECTION (ML) INTRAVENOUS CONTINUOUS PRN
Status: DISCONTINUED | OUTPATIENT
Start: 2022-05-11 | End: 2022-05-13 | Stop reason: HOSPADM

## 2022-05-11 RX ORDER — OXYTOCIN/0.9 % SODIUM CHLORIDE 30/500 ML
340 PLASTIC BAG, INJECTION (ML) INTRAVENOUS CONTINUOUS PRN
Status: DISCONTINUED | OUTPATIENT
Start: 2022-05-11 | End: 2022-05-13 | Stop reason: HOSPADM

## 2022-05-11 RX ORDER — CEFAZOLIN SODIUM/WATER 2 G/20 ML
2 SYRINGE (ML) INTRAVENOUS
Status: DISCONTINUED | OUTPATIENT
Start: 2022-05-11 | End: 2022-05-13 | Stop reason: HOSPADM

## 2022-05-11 RX ORDER — FENTANYL CITRATE 50 UG/ML
100 INJECTION, SOLUTION INTRAMUSCULAR; INTRAVENOUS
Status: DISCONTINUED | OUTPATIENT
Start: 2022-05-11 | End: 2022-05-11 | Stop reason: HOSPADM

## 2022-05-11 RX ORDER — MODIFIED LANOLIN
OINTMENT (GRAM) TOPICAL
Status: DISCONTINUED | OUTPATIENT
Start: 2022-05-11 | End: 2022-05-13 | Stop reason: HOSPADM

## 2022-05-11 RX ADMIN — SODIUM CHLORIDE, POTASSIUM CHLORIDE, SODIUM LACTATE AND CALCIUM CHLORIDE 500 ML: 600; 310; 30; 20 INJECTION, SOLUTION INTRAVENOUS at 08:59

## 2022-05-11 RX ADMIN — KETOROLAC TROMETHAMINE 30 MG: 30 INJECTION, SOLUTION INTRAMUSCULAR at 16:42

## 2022-05-11 RX ADMIN — LIDOCAINE HYDROCHLORIDE,EPINEPHRINE BITARTRATE 3 ML: 20; .005 INJECTION, SOLUTION EPIDURAL; INFILTRATION; INTRACAUDAL; PERINEURAL at 13:05

## 2022-05-11 RX ADMIN — FENTANYL CITRATE 100 MCG: 50 INJECTION INTRAMUSCULAR; INTRAVENOUS at 06:16

## 2022-05-11 RX ADMIN — Medication 2 MILLI-UNITS/MIN: at 11:30

## 2022-05-11 RX ADMIN — Medication 2 G: at 21:10

## 2022-05-11 RX ADMIN — Medication: at 13:09

## 2022-05-11 RX ADMIN — FENTANYL CITRATE 100 MCG: 50 INJECTION INTRAMUSCULAR; INTRAVENOUS at 08:33

## 2022-05-11 RX ADMIN — SODIUM CHLORIDE, POTASSIUM CHLORIDE, SODIUM LACTATE AND CALCIUM CHLORIDE: 600; 310; 30; 20 INJECTION, SOLUTION INTRAVENOUS at 15:38

## 2022-05-11 RX ADMIN — ONDANSETRON 4 MG: 2 INJECTION INTRAMUSCULAR; INTRAVENOUS at 06:21

## 2022-05-11 ASSESSMENT — ACTIVITIES OF DAILY LIVING (ADL)
FALL_HISTORY_WITHIN_LAST_SIX_MONTHS: NO
DOING_ERRANDS_INDEPENDENTLY_DIFFICULTY: NO
VISION_MANAGEMENT: GLASSES
TOILETING_ISSUES: NO
DIFFICULTY_EATING/SWALLOWING: NO
DRESSING/BATHING_DIFFICULTY: NO
CONCENTRATING,_REMEMBERING_OR_MAKING_DECISIONS_DIFFICULTY: NO
WALKING_OR_CLIMBING_STAIRS_DIFFICULTY: NO
CHANGE_IN_FUNCTIONAL_STATUS_SINCE_ONSET_OF_CURRENT_ILLNESS/INJURY: NO
WEAR_GLASSES_OR_BLIND: YES

## 2022-05-11 NOTE — PROGRESS NOTES
LABOR PROGRESS NOTE  2022 10:29 AM    SUBJECTIVE:  Pt made significant change after side line position, so pit not started as of yet. Contraction pattern improved, still with some coupling.    OBJECTIVE:  /64   Temp 98  F (36.7  C) (Oral)   Resp 16   SpO2 98%   Breastfeeding Yes   FHR: category 1  Uterine Contractions:  irregular, every 2 to 4 minutes, still with some coupling  Cervix: dilation 8 cm , 80% effaced, edematous cervix ,0 station.  Fluid: Clear.  Fetal Presentation: vertex, no caput    ASSESSMENT & PLAN:   36 year old  at 37w4d, active labor    Continue current cares    Anticipate     Completed by:   Beti Velásquez MD, M.D.  [facility]  2022 10:29 AM

## 2022-05-11 NOTE — ANESTHESIA PREPROCEDURE EVALUATION
Anesthesia Pre-Procedure Evaluation    Patient: Lupe Andersen   MRN: [id number] : 1985        Procedure :           Past Medical History:   Diagnosis Date     Uncomplicated asthma       Past Surgical History:   Procedure Laterality Date     APPENDECTOMY        Allergies   Allergen Reactions     Flagyl [Metronidazole] Dizziness      Social History     Tobacco Use     Smoking status: Never Smoker     Smokeless tobacco: Never Used   Substance Use Topics     Alcohol use: No      Wt Readings from Last 1 Encounters:   10/18/21 60.8 kg (134 lb)        Anesthesia Evaluation            ROS/MED HX  ENT/Pulmonary:     (+) asthma     Neurologic:  - neg neurologic ROS     Cardiovascular:  - neg cardiovascular ROS     METS/Exercise Tolerance: >4 METS    Hematologic:  - neg hematologic  ROS     Musculoskeletal:  - neg musculoskeletal ROS     GI/Hepatic:     (+) GERD,     Renal/Genitourinary:  - neg Renal ROS     Endo:     (+) Obesity,     Psychiatric/Substance Use:  - neg psychiatric ROS     Infectious Disease:  - neg infectious disease ROS     Malignancy:  - neg malignancy ROS     Other:      (+) Possibly pregnant (gravid), ,         Physical Exam    Airway        Mallampati: III   TM distance: > 3 FB   Neck ROM: full   Mouth opening: > 3 cm    Respiratory Devices and Support         Dental       (+) chipped      Cardiovascular   cardiovascular exam normal          Pulmonary   pulmonary exam normal            Other findings: gravid    OUTSIDE LABS:  CBC:   Lab Results   Component Value Date    WBC 9.6 2018    HGB 12.4 2018    HCT 37.5 2018     (L) 2018     BMP: No results found for: NA, POTASSIUM, CHLORIDE, CO2, BUN, CR, GLC  COAGS: No results found for: PTT, INR, FIBR  POC: No results found for: BGM, HCG, HCGS  HEPATIC: No results found for: ALBUMIN, PROTTOTAL, ALT, AST, GGT, ALKPHOS, BILITOTAL, BILIDIRECT, DEV  OTHER: No results found for: PH, LACT, A1C, COREY, PHOS, MAG,  LIPASE, AMYLASE, TSH, T4, T3, CRP, SED    Anesthesia Plan    ASA Status:  3   NPO Status:  NPO Appropriate    Anesthesia Type: Epidural.   Induction: N/a.   Maintenance: N/A.        Consents    Anesthesia Plan(s) and associated risks, benefits, and realistic alternatives discussed. Questions answered and patient/representative(s) expressed understanding.     - Discussed: Risks, Benefits and Alternatives for the PROCEDURE were discussed     - Discussed with:  Patient      - Extended Intubation/Ventilatory Support Discussed: No.      - Patient is DNR/DNI Status: No    Use of blood products discussed: No .     Postoperative Care    Post procedure pain management: Labor epidural.        Comments:    Other Comments: Routine JOSÉ LUIS Dugan MD

## 2022-05-11 NOTE — PROGRESS NOTES
RN remained at bedside for 15 minutes following nitrous oxide initiation. Pt is still uncomfortable, declining epidural. No c/o side effects. Tolerating well. Using nitrous oxide appropriately. Dr. Velásquez to order IV pit for labor progression.

## 2022-05-11 NOTE — PROGRESS NOTES
LABOR PROGRESS NOTE  2022 3:31 PM    SUBJECTIVE:  Cervix with very little change - softer and stretchier, and slightly lower station (0 to +1), but still 9cm- thicker and edematous on right.  Minimal caput developing.  Irregular contraction pattern with some coupling, though starting to get more frequent and regular contractions with pit now at 10.  FHTs category 2; moderate variability with intermittent variable decels.     OBJECTIVE:  BP (!) 161/81 (BP Location: Right arm, Patient Position: Sitting, Cuff Size: Adult Regular)   Temp 97.9  F (36.6  C) (Oral)   Resp (P) 16   SpO2 100%   Breastfeeding Yes   FHR:category 2 with moderate variability and intermittent variable decles  Uterine Contractions:  irregular, every every 2 to 5 minutes, though past 4 contractions every 2  Cervix: dilation 9 cm , 80 to 90% effaced (thin on left, thick/edematous on right; soft and stretchy,0 to +1 station  Fluid: Clear.  Fetal Presentation: vertex.    ASSESSMENT & PLAN:   36 year old  at 37w4d with labor dystocia, pt prefers vaginal birth, and would like to avoid  if possible    Discussed with OB; benadryl for cervical edema- I will check her just prior to administration in case she has made change    She will come to assess to see about turning baby, or some closed leg pushing depending on cervical examination    Completed by:   Beti Velásquez MD,  [facility]  2022 3:31 PM    ADDENDUM:  I checked patient just prior to administration of benadryl, and she was +1 +2, and thick anterior lip, so we did NOT administer the benadryl and also canceled the OB consult

## 2022-05-11 NOTE — H&P
Maternal Admission H&P  [facility]  Date of Admission: 2022  Date of Service: 2022    Name      Lupe Lopes         1985  MRN       [id number]  PCP        Beti Velásquez         Assessment and Plan  36 year old  at 37w4d, in labor and ruptured.    Anticipate .    Analgesia per patient's wishes    Group B strep: negative    Uncomplicated pregnancy      Chief Complaint  Labor and SROM    HPI  Lupe Lopes is a 36 year old woman at 37w4d, based on an Estimated Date of Delivery: May 28, 2022. She presents with labor and SROM around 5am this morning.  I was called to come in when patient around 6cm.  She is still approximately 5 to 6cm, now 80% effaced, -2. Past obstetrical history significant for vaginal breech delivery with her first baby in her [place]), shoulder dystocia with her second delivery (at [place]).  This pregnancy has been uncomplicated.  GBS negative.     Patient Active Problem List    Diagnosis Date Noted     Uterine contractions 2022     Priority: Medium     Encounter for triage in pregnant patient 2022     Priority: Medium     Mild intermittent asthma 10/18/2021     Priority: Medium     Sciatica 10/18/2021     Priority: Medium      (normal spontaneous vaginal delivery) 2018     Priority: Medium     Pregnant 2018     Priority: Medium     Uterine atony, postpartum, current hospitalization 10/14/2016     Priority: Medium      OB History    Para Term  AB Living   6 3 3 0 1 3   SAB IAB Ectopic Multiple Live Births   0 0 0 0 3      # Outcome Date GA Lbr Kelvin/2nd Weight Sex Delivery Anes PTL Lv   6 Current            5 Term 18 39w6d 07:00 / 00:09 3.402 kg (7 lb 8 oz) M Vag-Spont Local, EPI N MESERET      Complications: Fetal Intolerance      Name: ALINA LOPES      Apgar1: 8  Apgar5: 9   4 Term 10/14/16    F Vag-Spont  N MESERET      Complications:  Shoulder Dystocia, Hemorrhage   3 Term 08   3 kg (6 lb 9.8 oz) M Vag-Spont None N MESERET   2 AB            1                 Review of Systems   Negative except what is noted in HPI.     Past Medical History  Past Medical History:   Diagnosis Date     Uncomplicated asthma         Past Surgical History  Past Surgical History:   Procedure Laterality Date     APPENDECTOMY         Allergies  Flagyl [metronidazole]    Family History  History reviewed. No pertinent family history.    Social History  I have reviewed this patient's social history and updated it with pertinent information if needed. Lupe Andersen  reports that she has never smoked. She has never used smokeless tobacco. She reports that she does not drink alcohol and does not use drugs.    Prior to Admission Medication List  Medications Prior to Admission   Medication Sig Dispense Refill Last Dose     acetaminophen (TYLENOL) 325 MG tablet [ACETAMINOPHEN (TYLENOL) 325 MG TABLET] Take 1-2 tablets (325-650 mg total) by mouth every 4 (four) hours as needed.  0      ferrous sulfate 65 mg elemental iron [FERROUS SULFATE 65 MG ELEMENTAL IRON] Take 1 tablet by mouth daily with breakfast.        ibuprofen (ADVIL,MOTRIN) 800 MG tablet [IBUPROFEN (ADVIL,MOTRIN) 800 MG TABLET] Take 1 tablet (800 mg total) by mouth every 8 (eight) hours.  0      lanolin (LANSINOH HPA) 100 % Oint [LANOLIN (LANSINOH HPA) 100 % OINT] Apply to nipples after feeds  0      prenatal vitamin iron-folic acid 27mg-0.8mg (PRENATAL S) 27 mg iron- 800 mcg Tab tablet [PRENATAL VITAMIN IRON-FOLIC ACID 27MG-0.8MG (PRENATAL S) 27 MG IRON- 800 MCG TAB TABLET] Take 1 tablet by mouth daily.           Allergies  Allergies   Allergen Reactions     Flagyl [Metronidazole] Dizziness     Immunization History   Administered Date(s) Administered     Tdap (Adacel,Boostrix) 2018        Physical Exam  Breastfeeding Yes   HEART: RRR, no murmur  LUNGS: CTA bilaterally  Fetal Heart Tones: 135 to  140 baseline, moderate variablility, + accels, variables, and Category II  CONTRACTIONS:  frequency q 1 to 5 minutes- some coupling  CERVIX: 5 to 6 cm/80% effaced/-2  FLUID: Clear.  Fetal Presentation: vertex.    Labs  Group B Strep PCR   Date Value Ref Range Status   05/02/2022 Negative Negative Final     Comment:     Presumed negative for Streptococcus agalactiae (Group B Streptococcus) or the number of organisms may be below the limit of detection of the assay.      Antibody Screen   Date Value Ref Range Status   05/11/2022 Negative Negative Final     Hepatitis B Surface Antigen   Date Value Ref Range Status   05/30/2017 Negative Negative Final     Hemoglobin   Date Value Ref Range Status   01/05/2018 12.4 12.0 - 16.0 g/dL Final      Admission on 05/11/2022   Component Date Value Ref Range Status     ABO/RH(D) 05/11/2022 B POS   Final     Antibody Screen 05/11/2022 Negative  Negative Final     SPECIMEN EXPIRATION DATE 05/11/2022 [id number]   Final     SARS CoV2 PCR 05/11/2022 Negative  Negative Final    NEGATIVE: SARS-CoV-2 (COVID-19) RNA not detected, presumed negative.     Hold Specimen 05/11/2022 JI   Final     Hold Specimen 05/11/2022 JI   Final        Completed by:   Beti Velásquez MD  [facility]  5/11/2022 7:57 AM

## 2022-05-11 NOTE — PROGRESS NOTES
Pt now with epidural and comfortable.  Cervical exam is still 9, thicker on right and anteriorly, but much softer than previous, and still 0+ station. Contraction pattern still irregular with contractions every 4 to 5 minutes, some coupling.  Pit at 6. FHTs with moderate variability and variable decels. Will continue with pitocin and now will try peanut ball again. Recheck in 1 hour and OB consult if no significant change.

## 2022-05-11 NOTE — PROGRESS NOTES
Dr Velásquez notified of [place]'s arrival today in labor with SROM. Cat 1 tracing, SVE 3-4/70/-2.  Unsure of pain control plans yet, able to have whatever she would like.

## 2022-05-11 NOTE — PROGRESS NOTES
Per RN exam, pt unchanged from previous at last exam about 15 minutes ago.  Pt requesting epidural- orders given and awaiting placement. Better labor patten on pit now.  FHTs category 1.

## 2022-05-11 NOTE — PROGRESS NOTES
Nitrous oxide initiated. RN provided patient education on use and side effects. Pt demonstrates understanding of education.

## 2022-05-11 NOTE — PROGRESS NOTES
LABOR PROGRESS NOTE  2022 9:59 AM    SUBJECTIVE:  Pt with slow labor progress and some coupling. Using fentanyl for pain. Declines epidural. FHTs currently category 1.    OBJECTIVE:  /64   Temp 98  F (36.7  C) (Oral)   Resp 16   SpO2 98%   Breastfeeding Yes   FHR: 140s baseline with moderate variability, accels present, no decels; currently cat 1.  Uterine Contractions:  irregular, every 1 to 5 minutes with some coupling.  Cervix: dilation 6 cm , 80% effaced, with developing cervical edema compared to last exam, -2 station,  Fluid: Clear.  Fetal Presentation: vertex.    ASSESSMENT & PLAN:   36 year old  at 37w4d with labor dystocia    Trying sideline positioning    Fluid bolus    Will start low dose pit    Completed by:   Beti Velásquez MD  [facility]  2022 9:59 AM

## 2022-05-11 NOTE — L&D DELIVERY NOTE
OB Vaginal Delivery Note    Lupe Andersen MRN# [id number]   Age: 36 year old YOB: 1985       GA: 37w4d  GP:   Labor Complications: Dysfunctional Labor , retained placenta, post partum hemorrhage  EBL: 1600  mL  Delivery QBL:    Delivery Type: Vaginal, Spontaneous   ROM to Delivery Time: rupture date or rupture time have not been documented  [place] Weight:     1 Minute 5 Minute 10 Minute   Apgar Totals: 8   9        ALYSSA NELSON;ERA WOODALL     Delivery Details:  Lupe Andersen, a 36 year old  female delivered a viable infant with apgars of 8  and 9 . Labor dystocia present with irregular contraction pattern and slow progression.  Several position changes, peanut ball, and pitocin administered.  Received 2 doses of fentanyl, nitrous, then epidural placed around 1300. Patient was fully dilated and pushing approx 12 hours after arrival. Delivery was via vaginal, spontaneous  to a sterile field under epidural  anesthesia. Infant delivered in vertex  left  occiput  anterior  position at 1612 with 2 pushes. Anterior and posterior shoulders delivered without difficulty. The cord was clamped, cut twice and 3 vessels  were noted.Cord blood not needed Pitocin given after delivery of the baby and before placenta delivery.   Cord complications: none      Placenta had not delivered by 1642 so in house OB called at that time, pt had some blood let approximately 10 minutes prior to that.Blood loss noted to be 675 at that time and bleeding still noted, so asked for second IV and made sure hemorrhage meds in the room. Blood loss at about 1200 by the time OB arrived at approximately 1655.  She was able to extract the placenta manually with several passes and several placenta pieces adherent to amnion noted. Pt then was found to be firm and contracted and no additional bleeding after placenta removal.  Total blood loss approx 1600 (see QBL, also).  Stage 3 hemorrhage- labs  ordered and will be followed per protocol, and 1 unit pRBC will be given per protocol (indication rapid blood loss)  Cefazolin given per protocol and also due to manual extraction. Stable blood pressure and mildly tachycardic- 105 to 115. Placental disposition was Hospital disposal ..     Episiotomy: none    Perineum, vagina, cervix were inspected, and the following lacerations were noted:   Perineal lacerations: none                    Excellent hemostasis was noted after manual extraction. Needle count correct. Infant and patient in delivery room in good and stable condition.        Wayne Andersen, Female-Lupe [[id number]]    Labor Event Times    Labor onset date: 5/10/22 Onset time: 11:00 PM   Dilation complete date: 22 Complete time:  4:05 PM   Start pushing date/time: 2022 1607      Labor Events     labor?: No   steroids: None  Labor Type: Spontaneous  Predominate monitoring during 1st stage: continuous electronic fetal monitoring     Antibiotics received during labor?: No     Rupture date/time: 22    Rupture type: Spontaneous rupture of membranes occuring during spontaneous labor or augmentation  Fluid color: Pink  Fluid odor: Normal     Augmentation: Oxytocin  Indications for augmentation: Ineffective Contraction Pattern  1:1 continuous labor support provided by?: RN       Delivery/Placenta Date and Time    Delivery Date: 22 Delivery Time:  4:12 PM   Oxytocin given at the time of delivery: after delivery of baby  Delivering clinician: Beti Velásquez MD   Other personnel present at delivery:  Provider Role   Haley Lobo RN Registered Nurse   Tari White RN Registered Nurse         Vaginal Counts     Initial count performed by 2 team members:  Two Team Members   Dr Velásquez       [place] Suture Needles Sponges (RETIRED) Instruments   Initial counts   5    Added to count       Relief counts       Final counts             Placed during labor Accounted for at  the end of labor   FSE No NA   IUPC No NA   Cervidil No NA              Final count performed by 2 team members:  Two Team Members   Dr Velásquez         Apgars    Living status: Living   1 Minute 5 Minute 10 Minute 15 Minute 20 Minute   Skin color: 0  1       Heart rate: 2  2       Reflex irritability: 2  2       Muscle tone: 2  2       Respiratory effort: 2  2       Total: 8  9       Apgars assigned by: TARI WHITE RN     Cord    Vessels: 3 Vessels    Cord Complications: None               Cord Blood Disposition: Discard    Gases Sent?: No    Delayed cord clamping?: Yes    Cord Clamping Delay (seconds):  seconds    Stem cell collection?: No       [place] Resuscitation    Methods: None     Skin to Skin and Feeding Plan    Skin to skin initiation date/time: 1841    Skin to skin with: Mother  Skin to skin end date/time:        Labor Events and Shoulder Dystocia    Fetal Tracing Prior to Delivery: Category 2  Fetal Tracing Comments: moderate variability with intermittent variable decels  Shoulder dystocia present?: Neg     Delivery (Maternal) (Provider to Complete) ([id number])    Episiotomy: None  Perineal lacerations: None    Est. blood loss (mL): 1600  Repair suture: None  Genital tract inspection done: Pos     Blood Loss  Mother: Lupe Pierre V #[id number]   Start of Mother's Information    Delivery Blood Loss  05/10/22 2300 - 22 1723    EBL (mL) Hospital Encounter 1600 mL    Total  1600 mL         End of Mother's Information  Mother: Lupe Pierre V #[id number]          Delivery - Provider to Complete ([id number])    Delivering clinician: Beti Velásquez MD  Attempted Delivery Types (Choose all that apply): Spontaneous Vaginal Delivery  Delivery Type (Choose the 1 that will go to the Birth History): Vaginal, Spontaneous                   Other personnel:  Provider Role   Haley Lobo RN Registered Nurse   Tari White RN Registered Nurse                Placenta     Removal: Manual Removal  Comments: retained placenta with hemorrhage  Disposition: Hospital disposal           Anesthesia    Method: Epidural                Presentation and Position    Presentation: Vertex    Position: Left Occiput Anterior                 Beti Velásquez MD

## 2022-05-11 NOTE — PROGRESS NOTES
Coupling and variable decels on EFM. 500 mL bolus of LR given and repositioned on Lupe onto her right side. Medicated for pain with IV fentanyl. Reports relief.

## 2022-05-11 NOTE — ANESTHESIA PROCEDURE NOTES
Epidural catheter Procedure Note    Pre-Procedure   Staff -        Anesthesiologist:  Christy Dugan MD       Performed By: anesthesiologist       Location: OB       Procedure Start/Stop Times: 5/11/2022 1:05 PM and 5/11/2022 1:10 PM       Pre-Anesthestic Checklist: patient identified, IV checked, risks and benefits discussed, informed consent, monitors and equipment checked, pre-op evaluation, at physician/surgeon's request and post-op pain management  Timeout:       Correct Patient: Yes        Correct Procedure: Yes        Correct Site: Yes        Correct Position: Yes   Procedure Documentation  Procedure: epidural catheter       Diagnosis: LE       Patient Position: sitting       Skin prep: Chloraprep       Local skin infiltrated with 2 mL of 1% lidocaine.        Insertion Site: L2-3. (midline approach).       Technique: LORT air        Needle Type: [employer] needle       Needle Gauge: 17.        Catheter: 20 G.          Catheter threaded easily.             # of attempts: 1 and  # of redirects:  0    Assessment/Narrative         Paresthesias: No.       Test dose of 5 mL lidocaine 1.5% w/ 1:200,000 epinephrine at.         Test dose negative, 3 minutes after injection, for signs of intravascular, subdural, or intrathecal injection.       Insertion/Infusion Method: LORT air       Aspiration negative for Heme or CSF via Epidural Catheter.       Sensory Level Left: T10.       Sensory Level Right: T10.    Medication(s) Administered   2% Lidocaine w/ 1:200K Epi (EPIDURAL) - EPIDURAL   3 mL - 5/11/2022 1:05:00 PM  Medication Administration Time: 5/11/2022 1:05 PM     Comments:  Patient tolerated procedure well.  RN and family present.  No PDPH.  VSS.  No issues

## 2022-05-11 NOTE — PROGRESS NOTES
Pt now a rim on the left side, but still quite edematous on the right side and contractions have spaced out to every 4 to 5 minutes. Will start low dose pit. Anticipate .

## 2022-05-12 LAB
APTT PPP: 28 SECONDS (ref 22–38)
APTT PPP: 29 SECONDS (ref 22–38)
APTT PPP: 29 SECONDS (ref 22–38)
D DIMER PPP FEU-MCNC: 10.5 UG/ML FEU (ref 0–0.5)
D DIMER PPP FEU-MCNC: 12.32 UG/ML FEU (ref 0–0.5)
D DIMER PPP FEU-MCNC: 9.97 UG/ML FEU (ref 0–0.5)
ERYTHROCYTE [DISTWIDTH] IN BLOOD BY AUTOMATED COUNT: 14.2 % (ref 10–15)
ERYTHROCYTE [DISTWIDTH] IN BLOOD BY AUTOMATED COUNT: 14.4 % (ref 10–15)
ERYTHROCYTE [DISTWIDTH] IN BLOOD BY AUTOMATED COUNT: 14.5 % (ref 10–15)
FIBRINOGEN PPP-MCNC: 362 MG/DL (ref 170–490)
FIBRINOGEN PPP-MCNC: 363 MG/DL (ref 170–490)
FIBRINOGEN PPP-MCNC: 387 MG/DL (ref 170–490)
HCT VFR BLD AUTO: 24.7 % (ref 35–47)
HCT VFR BLD AUTO: 26.4 % (ref 35–47)
HCT VFR BLD AUTO: 28.5 % (ref 35–47)
HGB BLD-MCNC: 8 G/DL (ref 11.7–15.7)
HGB BLD-MCNC: 8.8 G/DL (ref 11.7–15.7)
HGB BLD-MCNC: 9.5 G/DL (ref 11.7–15.7)
HOLD SPECIMEN: NORMAL
INR PPP: 1.1 (ref 0.85–1.15)
INR PPP: 1.12 (ref 0.85–1.15)
INR PPP: 1.13 (ref 0.85–1.15)
MCH RBC QN AUTO: 28.5 PG (ref 26.5–33)
MCH RBC QN AUTO: 29 PG (ref 26.5–33)
MCH RBC QN AUTO: 29.3 PG (ref 26.5–33)
MCHC RBC AUTO-ENTMCNC: 32.4 G/DL (ref 31.5–36.5)
MCHC RBC AUTO-ENTMCNC: 33.3 G/DL (ref 31.5–36.5)
MCHC RBC AUTO-ENTMCNC: 33.3 G/DL (ref 31.5–36.5)
MCV RBC AUTO: 87 FL (ref 78–100)
MCV RBC AUTO: 88 FL (ref 78–100)
MCV RBC AUTO: 88 FL (ref 78–100)
PLATELET # BLD AUTO: 109 10E3/UL (ref 150–450)
PLATELET # BLD AUTO: 118 10E3/UL (ref 150–450)
PLATELET # BLD AUTO: 132 10E3/UL (ref 150–450)
RBC # BLD AUTO: 2.81 10E6/UL (ref 3.8–5.2)
RBC # BLD AUTO: 3 10E6/UL (ref 3.8–5.2)
RBC # BLD AUTO: 3.28 10E6/UL (ref 3.8–5.2)
WBC # BLD AUTO: 10.9 10E3/UL (ref 4–11)
WBC # BLD AUTO: 11.4 10E3/UL (ref 4–11)
WBC # BLD AUTO: 14.4 10E3/UL (ref 4–11)

## 2022-05-12 PROCEDURE — 250N000013 HC RX MED GY IP 250 OP 250 PS 637: Performed by: FAMILY MEDICINE

## 2022-05-12 PROCEDURE — 85027 COMPLETE CBC AUTOMATED: CPT | Performed by: FAMILY MEDICINE

## 2022-05-12 PROCEDURE — 85384 FIBRINOGEN ACTIVITY: CPT | Performed by: FAMILY MEDICINE

## 2022-05-12 PROCEDURE — 85379 FIBRIN DEGRADATION QUANT: CPT | Performed by: FAMILY MEDICINE

## 2022-05-12 PROCEDURE — 85730 THROMBOPLASTIN TIME PARTIAL: CPT | Performed by: FAMILY MEDICINE

## 2022-05-12 PROCEDURE — 85610 PROTHROMBIN TIME: CPT | Performed by: FAMILY MEDICINE

## 2022-05-12 PROCEDURE — 36415 COLL VENOUS BLD VENIPUNCTURE: CPT | Performed by: FAMILY MEDICINE

## 2022-05-12 PROCEDURE — 120N000001 HC R&B MED SURG/OB

## 2022-05-12 RX ORDER — FERROUS SULFATE 325(65) MG
325 TABLET ORAL DAILY
Status: DISCONTINUED | OUTPATIENT
Start: 2022-05-12 | End: 2022-05-13 | Stop reason: HOSPADM

## 2022-05-12 RX ADMIN — IBUPROFEN 800 MG: 800 TABLET ORAL at 08:05

## 2022-05-12 RX ADMIN — ACETAMINOPHEN 650 MG: 325 TABLET ORAL at 11:47

## 2022-05-12 RX ADMIN — IBUPROFEN 800 MG: 800 TABLET ORAL at 15:32

## 2022-05-12 RX ADMIN — FERROUS SULFATE TAB 325 MG (65 MG ELEMENTAL FE) 325 MG: 325 (65 FE) TAB at 21:36

## 2022-05-12 RX ADMIN — IBUPROFEN 800 MG: 800 TABLET ORAL at 01:29

## 2022-05-12 RX ADMIN — ACETAMINOPHEN 650 MG: 325 TABLET ORAL at 21:36

## 2022-05-12 RX ADMIN — DOCUSATE SODIUM 100 MG: 100 CAPSULE, LIQUID FILLED ORAL at 08:05

## 2022-05-12 NOTE — ANESTHESIA POSTPROCEDURE EVALUATION
Patient: Lupe Renae Ngana    Procedure: * No procedures listed *       Anesthesia Type:  Spinal    Note:  Disposition: Inpatient   Postop Pain Control: Uneventful            Sign Out: Well controlled pain   PONV: No   Neuro/Psych: Uneventful            Sign Out: Acceptable/Baseline neuro status   Airway/Respiratory: Uneventful            Sign Out: Acceptable/Baseline resp. status   CV/Hemodynamics: Uneventful            Sign Out: Acceptable CV status; No obvious hypovolemia; No obvious fluid overload   Other NRE: NONE   DID A NON-ROUTINE EVENT OCCUR? No           Last vitals:  Vitals Value Taken Time   BP     Temp     Pulse     Resp     SpO2         Electronically Signed By: Pavan Delgadillo MD  May 12, 2022  2:50 PM

## 2022-05-12 NOTE — PLAN OF CARE
Problem: Adjustment to Role Transition (Postpartum Vaginal Delivery)  Goal: Successful Maternal Role Transition  Intervention: Support Maternal Role Transition  Recent Flowsheet Documentation  Taken 5/12/2022 0130 by Malia Wang RN  Supportive Measures: active listening utilized     Problem: Pain (Postpartum Vaginal Delivery)  Goal: Acceptable Pain Control  Outcome: Ongoing, Progressing     Problem: Bleeding (Postpartum Vaginal Delivery)  Goal: Hemostasis  Outcome: Ongoing, Progressing     Pt vs are WNL. Bleeding is WNL. Pt has been eating and orally hydrating. She has been up without difficulty and is now independent. Denies pain. Is bonding well with baby.

## 2022-05-12 NOTE — CONSULTS
I was called to the room for retained placenta    Patient had delivery via spontaneous vaginal delivery at 16:12 on 5/11/22 and I was called approximately 30 minutes after delivery.  Dr. Velásquez was not able to express/delivery the placenta and there was a moderate amount of vaginal bleeding.     After verbal consent was obtained I manually removed the placenta.  At first there was only about 1/2 of the placenta and I was able to reach the remaining placenta in the fundus of the uterus.  After 2 additional sweeps of the uterine cavity I was able to remove the remainder of the placenta.  On the final sweep I was not able to appreciate any remaining placenta    The bleeding slowed down  She was given pitocin  There were no complications.   We will observe to see if there is the anticipated amount of postpartum bleeding     Odette Padilla MD  [facility]  [phone]

## 2022-05-13 VITALS
HEART RATE: 78 BPM | DIASTOLIC BLOOD PRESSURE: 60 MMHG | SYSTOLIC BLOOD PRESSURE: 128 MMHG | OXYGEN SATURATION: 98 % | TEMPERATURE: 97.7 F | RESPIRATION RATE: 18 BRPM

## 2022-05-13 PROBLEM — D62 ANEMIA DUE TO BLOOD LOSS, ACUTE: Status: ACTIVE | Noted: 2022-05-13

## 2022-05-13 PROBLEM — J45.20 MILD INTERMITTENT ASTHMA: Status: ACTIVE | Noted: 2021-10-18

## 2022-05-13 LAB
HGB BLD-MCNC: 7.5 G/DL (ref 11.7–15.7)
HGB BLD-MCNC: 7.5 G/DL (ref 11.7–15.7)

## 2022-05-13 PROCEDURE — 36415 COLL VENOUS BLD VENIPUNCTURE: CPT

## 2022-05-13 PROCEDURE — 36415 COLL VENOUS BLD VENIPUNCTURE: CPT | Performed by: FAMILY MEDICINE

## 2022-05-13 PROCEDURE — 85018 HEMOGLOBIN: CPT

## 2022-05-13 PROCEDURE — 85018 HEMOGLOBIN: CPT | Performed by: FAMILY MEDICINE

## 2022-05-13 PROCEDURE — 250N000013 HC RX MED GY IP 250 OP 250 PS 637: Performed by: FAMILY MEDICINE

## 2022-05-13 RX ORDER — MODIFIED LANOLIN
OINTMENT (GRAM) TOPICAL
Start: 2022-05-13

## 2022-05-13 RX ORDER — FERROUS SULFATE 325(65) MG
325 TABLET ORAL 2 TIMES DAILY
Qty: 120 TABLET | Refills: 2 | Status: SHIPPED | OUTPATIENT
Start: 2022-05-13

## 2022-05-13 RX ORDER — DOCUSATE SODIUM 100 MG/1
100 CAPSULE, LIQUID FILLED ORAL 2 TIMES DAILY PRN
Qty: 60 CAPSULE | Refills: 1 | Status: SHIPPED | OUTPATIENT
Start: 2022-05-13

## 2022-05-13 RX ORDER — IBUPROFEN 600 MG/1
600 TABLET, FILM COATED ORAL EVERY 6 HOURS PRN
Qty: 30 TABLET | Refills: 0 | Status: SHIPPED | OUTPATIENT
Start: 2022-05-13

## 2022-05-13 RX ORDER — ACETAMINOPHEN 500 MG
1000 TABLET ORAL EVERY 6 HOURS PRN
Qty: 90 TABLET | Refills: 1 | Status: SHIPPED | OUTPATIENT
Start: 2022-05-13

## 2022-05-13 RX ADMIN — IBUPROFEN 800 MG: 800 TABLET ORAL at 09:37

## 2022-05-13 RX ADMIN — DOCUSATE SODIUM 100 MG: 100 CAPSULE, LIQUID FILLED ORAL at 09:36

## 2022-05-13 RX ADMIN — FERROUS SULFATE TAB 325 MG (65 MG ELEMENTAL FE) 325 MG: 325 (65 FE) TAB at 09:37

## 2022-05-13 NOTE — DISCHARGE SUMMARY
"[facility]    Discharge Summary  Obstetrics    Date of Admission:  2022  Date of Discharge:  2022  Discharging Provider: Tabitha Street MD    Discharge Diagnoses     Retained placenta requiring manual extraction  Postpartum hemorrhage (Stage 3, ~1600 ml total blood loss) requiring 1 unit PRBCs    History of Present Illness   Lupe Andersen is a 36 year old female who presented with spontaneous labor. She received pitocin for augmentation due to irregular contraction pattern.  Per delivery summary: \"Lupe Andersen, a 36 year old  female delivered a viable infant with apgars of 8  and 9 . Labor dystocia present with irregular contraction pattern and slow progression.  Several position changes, peanut ball, and pitocin administered.  Received 2 doses of fentanyl, nitrous, then epidural placed around 1300. Patient was fully dilated and pushing approx 12 hours after arrival. Delivery was via vaginal, spontaneous  to a sterile field under epidural  anesthesia. Infant delivered in vertex  left  occiput  anterior  position at 1612 with 2 pushes. Anterior and posterior shoulders delivered without difficulty. The cord was clamped, cut twice and 3 vessels  were noted.Cord blood not needed Pitocin given after delivery of the baby and before placenta delivery.   Cord complications: none       Placenta had not delivered by 1642 so in house OB called at that time, pt had some blood let approximately 10 minutes prior to that.Blood loss noted to be 675 at that time and bleeding still noted, so asked for second IV and made sure hemorrhage meds in the room. Blood loss at about 1200 by the time OB arrived at approximately 1655.  She was able to extract the placenta manually with several passes and several placenta pieces adherent to amnion noted. Pt then was found to be firm and contracted and no additional bleeding after placenta removal.  Total blood loss approx " "1600 (see QBL, also).  Stage 3 hemorrhage- labs ordered and will be followed per protocol, and 1 unit pRBC will be given per protocol (indication rapid blood loss)  Cefazolin given per protocol and also due to manual extraction. Stable blood pressure and mildly tachycardic- 105 to 115. Placental disposition was Hospital disposal\"    Hospital Course   The patient's hospital course was remarkable for the above. Hgb continues to trend down (8.8 the morning of , then 8.0 that evening, 7.5 the morning of ).   On discharge, her pain was well controlled. Vaginal bleeding not heavy, denies heavy bleeding, lighter than menstrual bleeding.  Voiding without difficulty.  Ambulating well and tolerating a normal diet.  No fevers.  Breastfeeding well with formula supplementation.  Infant is stable.  She was discharged on post-partum day #2.    Discussed her hemoglobin. She is asymptomatic and vitals are normal. Aware of symptoms of severe anemia. Advised to present to ER if symptoms arise, she is agreeable. Check Hgb again before discharge. If still >7, will allow discharge with above precautions and plan for hgb check early next week in conjunction with baby's  visit.    Post-partum hemoglobin:   Hemoglobin   Date Value Ref Range Status   2022 7.5 (L) 11.7 - 15.7 g/dL Final       Thor Depression Scale  Thoughts of Harming Self:    Total Score:        Tabitha Street MD    Discharge Disposition   Discharged to home   Condition at discharge: Stable    Primary Care Physician   Beti Velásquez    Consultations This Hospital Stay   CARE MANAGEMENT / SOCIAL WORK IP CONSULT  HOME CARE POST PARTUM/ IP CONSULT  LACTATION IP CONSULT    Discharge Orders      Activity    Activity as tolerated     Reason for your hospital stay    Maternity care     Follow Up    Follow up with provider in 6 weeks for post-delivery checks. Follow up with primary care provider at your baby's  visit and check " hemoglobin.     Postpartum Exercises for vaginal delivery    These exercises may be started soon after delivery. If you feel tired or uncomfortable, stop and try these exercises after resting. Check for any separation in your stomach wall before doing exercises that involve twisting or stress on your stomach muscles. To check this place your fingers in the center of your upper abdomen and lift your head and shoulders up in a partial sit-up. If you feel a separation in your muscle wall, it is too soon to do sit ups.   1) Tighten and relax your pelvic floor muscles often.   2) Breathe deeply while laying on your back. As you breathe out, lift just your head up and pull the sides of your stomach toward the middle with your hands. Then breathe in as you put your head down. This will help to close the separation of your stomach.   3) Tilt your pelvis back and forth. Alternate this with full body stretches.   4) Move your feet back and forth, then in circular motions.   5) While lying on your back, slide your heels toward your hips and bend your knees one at a time, then together.   6) While lying flat on the floor, bend your knees and raise your legs one at a time.   7) When the stomach wall separation has closed, you can progress to straight curl-ups, diagonal curl-ups, and side leg lifts.     Postpartum Anemia    Helpful tips to increase the iron levels in your blood:     Take prenatal vitamins as recommended and extra iron supplement which you can buy over the counter (ferrous sulfate, ferrous gluconate, Slow Fe or slow release iron, Floradix, or liquid chlorophyll)     Consume a vitamin C source with supplement to improve absorption (citrus fruits, orange juice, potatoes, tomatoes, broccoli, green leafy vegetables, strawberries, or 500 mg table of vitamin C)     Avoid taking in dairy products or an antacid near the same time as you take your iron supplement     If stomach upset occurs, try taking supplement  immediately after a meal instead of on an empty stomach or take it less frequently, just don't stop taking it completely.     If constipation occurs take a stool softener or fiber supplement daily.     Breast pump - Manual/Electric    Breast Pump Documentation:  Manual/Electric Pump: To support adequate breast milk production and nutrition for infant.     I, the undersigned, certify that the above prescribed supplies are medically necessary for this patient and is both reasonable and necessary in reference to accepted standards of medical and necessary in reference to accepted standards of medical practice in the treatment of this patient's condition and is not prescribed as a convenience.     Diet    Resume previous diet     Discharge Medications   Current Discharge Medication List      START taking these medications    Details   benzocaine-menthol (DERMOPLAST) 20-0.5 % AERO Apply 1 g topically 4 times daily as needed (perineal pain)    Associated Diagnoses: Postpartum state      docusate sodium (COLACE) 100 MG capsule Take 1 capsule (100 mg) by mouth 2 times daily as needed for constipation  Qty: 60 capsule, Refills: 1    Associated Diagnoses: Postpartum state      lanolin ointment Apply topically every hour as needed for other (sore nipples)    Associated Diagnoses: Postpartum state      witch hazel-glycerin (TUCKS) pad Apply topically every hour as needed for hemorrhoids or other (episiotomy pain/itching)    Associated Diagnoses: Postpartum state         CONTINUE these medications which have CHANGED    Details   acetaminophen (TYLENOL) 500 MG tablet Take 2 tablets (1,000 mg) by mouth every 6 hours as needed for mild pain or fever (greater than or equal to 38  C /100.4  F (oral) or 38.5  C/ 101.4  F (core).)  Qty: 90 tablet, Refills: 1    Associated Diagnoses: Postpartum state      ferrous sulfate (FEROSUL) 325 (65 Fe) MG tablet Take 1 tablet (325 mg) by mouth 2 times daily  Qty: 120 tablet, Refills: 2     Associated Diagnoses: Anemia due to blood loss, acute      ibuprofen (ADVIL/MOTRIN) 600 MG tablet Take 1 tablet (600 mg) by mouth every 6 hours as needed for other, moderate pain or mild pain (cramping)  Qty: 30 tablet, Refills: 0    Associated Diagnoses: Postpartum state         CONTINUE these medications which have NOT CHANGED    Details   prenatal vitamin iron-folic acid 27mg-0.8mg (PRENATAL S) 27 mg iron- 800 mcg Tab tablet [PRENATAL VITAMIN IRON-FOLIC ACID 27MG-0.8MG (PRENATAL S) 27 MG IRON- 800 MCG TAB TABLET] Take 1 tablet by mouth daily.         STOP taking these medications       lanolin (LANSINOH HPA) 100 % Oint Comments:   Reason for Stopping:             Allergies   Allergies   Allergen Reactions     Flagyl [Metronidazole] Dizziness

## 2022-05-13 NOTE — PLAN OF CARE
Pt have tylenol and ibuprofen available and will request when needed.   Postpartum assessment WNL.   Bonding well w/ .  Hgb this AM came back at 7.5; pt asymptomatic.  RN instructed pt to alert nursing if feeling faint, dizzy, or light headed. Verbalizes understanding and agrees.    At 0050 RN attempted to assess - pt refused and was agitated. States  has not had good sleep because nursing is doing too many checks and it is disturbing; claims  VS were taken at 2100 on evening shift on . RN educated vitals/checks are done on all pts depending on delivery time; VS are crucial so nursing have a baseline which is to ensure pt safety should they have a change in condition. Pt agreed to have baby VS taken and refused full assessment. Will attempt full baby assessment at 0600 during weights.                Problem: Pain (Postpartum Vaginal Delivery)  Goal: Acceptable Pain Control  Outcome: Ongoing, Progressing     Problem: Adjustment to Role Transition (Postpartum Vaginal Delivery)  Goal: Successful Maternal Role Transition  Outcome: Ongoing, Progressing

## 2022-05-13 NOTE — PROGRESS NOTES
Maternal Postpartum Progress Note  [facility]  Date of Service: 2022    Name      Lupe Andersen         1985  MRN       [id number]  PCP        Beti Velásquez        Subjective:  The patient feels well:  Voiding without difficulty, lochia normal, tolerating normal diet, and passing flatus.  Pain is well controlled with current medications.  The patient has no emotional concerns.  No calf pain or swelling.  The baby is well and being fed by breast . Mom had retained placenta, manual extraction, and stage 3 hemorrhage yesterday.  Received 1 unit pRBCs.  Hgb 8.8 this morning, and 6 hours later, 8.0.  Mom feels well- no dizziness or lightheadedness.  Bleeding is lighter than a period. Has not yet started iron.  patient wish to stay until the morning.      Objective:  /59 (BP Location: Right arm, Patient Position: Semi-Whitehead's, Cuff Size: Adult Regular)   Pulse 78   Temp 97.7  F (36.5  C) (Oral)   Resp 16   SpO2 99%   Breastfeeding Yes   Lochia is minimal.  The uterine fundus is firm at umbilicus, sits off slightly to right, but palpated this way during labor as well.  Urinary output is adequate. No calf tenderness.  No edema.    Labs:  Hemoglobin   Date Value Ref Range Status   2022 8.0 (L) 11.7 - 15.7 g/dL Final       Assessment:    -Postpartum Day 1 s/p vaginal delivery  -status post post partum hemorrhage      Plan:    -Continue current care.  -start iron  -hgb in am  -anticipate discharge in morning.     Completed by:   Beti Velásquez MD  [facility]  2022 7:47 PM

## 2022-05-13 NOTE — DISCHARGE SUMMARY
Discharge instructions, danger signs, safe use of home medications and follow up appointment reviewed with patient.  Questions/concerns addressed at that time, patient verbalized understanding.  Escorted to the door with significant other & .  Elizabeth Wei RN  2022

## 2022-05-13 NOTE — PLAN OF CARE
"  Problem: Plan of Care - These are the overarching goals to be used throughout the patient stay.    Goal: Patient-Specific Goal (Individualized)  Description: You can add care plan individualizations to a care plan. Examples of Individualization might be:  \"Parent requests to be called daily at 9am for status\", \"I have a hard time hearing out of my right ear\", or \"Do not touch me to wake me up as it startles me\".  Outcome: Ongoing, Progressing     Problem: Plan of Care - These are the overarching goals to be used throughout the patient stay.    Goal: Readiness for Transition of Care  Outcome: Ongoing, Progressing   Pt has been resting in room. Pt currently stated she was experiencing some cramps after BF infant. Pt requested tylenol. Pt stated she has gotten relief from tylenol. Pt will continue to rate pain less than 3/10 throughout shift. Education provided. Addressed any questions and concerns. Will continue to monitor.  "

## 2022-05-13 NOTE — PROVIDER NOTIFICATION
MD (Dr. Tabitha Street) was called and updated regarding the most recent Hgb results. Pt is okay to discharge as ordered.

## 2022-05-13 NOTE — PLAN OF CARE
Problem: Bleeding (Postpartum Vaginal Delivery)  Goal: Hemostasis  Outcome: Ongoing, Progressing  Hgb = 7.5 today; fundus firm; bleeding is light; vitals stable. Plan for discharge today (order in place).     Problem: Pain (Postpartum Vaginal Delivery)  Goal: Acceptable Pain Control  Outcome: Ongoing, Progressing  Lupe reports adequate pain control. Tylenol and Ibuprofen prn.

## 2023-10-13 ENCOUNTER — LAB REQUISITION (OUTPATIENT)
Dept: LAB | Facility: CLINIC | Age: 38
End: 2023-10-13

## 2023-10-13 DIAGNOSIS — R10.32 LEFT LOWER QUADRANT PAIN: ICD-10-CM

## 2023-10-13 PROCEDURE — 80053 COMPREHEN METABOLIC PANEL: CPT | Performed by: FAMILY MEDICINE

## 2023-10-13 PROCEDURE — 86140 C-REACTIVE PROTEIN: CPT | Performed by: FAMILY MEDICINE

## 2023-10-13 PROCEDURE — 83690 ASSAY OF LIPASE: CPT | Performed by: FAMILY MEDICINE

## 2023-10-14 LAB
ALBUMIN SERPL BCG-MCNC: 4.5 G/DL (ref 3.5–5.2)
ALP SERPL-CCNC: 66 U/L (ref 35–104)
ALT SERPL W P-5'-P-CCNC: 17 U/L (ref 0–50)
ANION GAP SERPL CALCULATED.3IONS-SCNC: 11 MMOL/L (ref 7–15)
AST SERPL W P-5'-P-CCNC: 23 U/L (ref 0–45)
BILIRUB SERPL-MCNC: 0.2 MG/DL
BUN SERPL-MCNC: 12.4 MG/DL (ref 6–20)
CALCIUM SERPL-MCNC: 9.6 MG/DL (ref 8.6–10)
CHLORIDE SERPL-SCNC: 105 MMOL/L (ref 98–107)
CREAT SERPL-MCNC: 1.02 MG/DL (ref 0.51–0.95)
CRP SERPL-MCNC: <3 MG/L
DEPRECATED HCO3 PLAS-SCNC: 24 MMOL/L (ref 22–29)
EGFRCR SERPLBLD CKD-EPI 2021: 72 ML/MIN/1.73M2
GLUCOSE SERPL-MCNC: 94 MG/DL (ref 70–99)
LIPASE SERPL-CCNC: 58 U/L (ref 13–60)
POTASSIUM SERPL-SCNC: 3.8 MMOL/L (ref 3.4–5.3)
PROT SERPL-MCNC: 8.3 G/DL (ref 6.4–8.3)
SODIUM SERPL-SCNC: 140 MMOL/L (ref 135–145)

## 2024-10-17 ENCOUNTER — ANCILLARY PROCEDURE (OUTPATIENT)
Dept: MAMMOGRAPHY | Facility: CLINIC | Age: 39
End: 2024-10-17
Attending: FAMILY MEDICINE
Payer: COMMERCIAL

## 2024-10-17 DIAGNOSIS — N64.4 BREAST PAIN, LEFT: ICD-10-CM

## 2024-10-17 PROCEDURE — 76642 ULTRASOUND BREAST LIMITED: CPT | Mod: LT

## 2024-10-17 PROCEDURE — 77066 DX MAMMO INCL CAD BI: CPT

## 2025-05-08 ENCOUNTER — LAB REQUISITION (OUTPATIENT)
Dept: LAB | Facility: CLINIC | Age: 40
End: 2025-05-08

## 2025-05-08 PROCEDURE — 87081 CULTURE SCREEN ONLY: CPT

## 2025-05-10 LAB — BACTERIA SPEC CULT: NORMAL
